# Patient Record
Sex: FEMALE | Race: WHITE | NOT HISPANIC OR LATINO | Employment: UNEMPLOYED | URBAN - METROPOLITAN AREA
[De-identification: names, ages, dates, MRNs, and addresses within clinical notes are randomized per-mention and may not be internally consistent; named-entity substitution may affect disease eponyms.]

---

## 2023-02-22 ENCOUNTER — OUTSIDE PLACE OF SERVICE (OUTPATIENT)
Dept: PEDIATRIC CARDIOLOGY | Facility: CLINIC | Age: 28
End: 2023-02-22
Payer: MEDICAID

## 2023-02-22 PROCEDURE — 99204 PR OFFICE/OUTPT VISIT, NEW, LEVL IV, 45-59 MIN: ICD-10-PCS | Mod: 25,,, | Performed by: PEDIATRICS

## 2023-02-22 PROCEDURE — 76825 ECHO EXAM OF FETAL HEART: CPT | Mod: 26,,, | Performed by: PEDIATRICS

## 2023-02-22 PROCEDURE — 76827 ECHO EXAM OF FETAL HEART: CPT | Mod: 26,,, | Performed by: PEDIATRICS

## 2023-02-22 PROCEDURE — 99204 OFFICE O/P NEW MOD 45 MIN: CPT | Mod: 25,,, | Performed by: PEDIATRICS

## 2023-02-22 PROCEDURE — 76827 PR  SO2 FETAL HEART DOPPLER: ICD-10-PCS | Mod: 26,,, | Performed by: PEDIATRICS

## 2023-02-22 PROCEDURE — 76825 PR  SO2 FETAL HEART: ICD-10-PCS | Mod: 26,,, | Performed by: PEDIATRICS

## 2023-02-22 NOTE — PROGRESS NOTES
"[facility] [place] [facility]    OB: Ana Champion MD    MFM: Delfino Lomax MD    Today, I had the pleasure of evaluating Oksana Jackson who is now 28 y.o. and carrying her fifth pregnancy at 23w5d gestation with an RAFAL of 2023. She was referred for evaluation of the fetal heart due to a prior pregnancy complicated by congenital heart disease. The patient's daughter is 5 years old and had a septal defect. The defect did not require surgical repair and underwent spontaneous resolution. The patient plans to deliver at [facility].    She is carrying a female fetus, named Sho Braden".      Obstetric History:    .  Her OB history is significant for severe postpartum depression.     Past Medical History:   Diagnosis Date    Anxiety state 2020 1:10:32 PM    [facility] Historical - Quick Add: Anxiety and depression-No Additional Notes    Gonococcal infection (acute) of lower genitourinary tract 2020 5:17:31 PM    [facility] Historical - Gynecologic: Gonorrhea-No Additional Notes    Streptococcus, group B, as the cause of diseases classified to other chapters 2019 12:47:29 PM    [facility] Historical - LWHA: Group B Strep-No Additional Notes    Trichomoniasis 2017 12:30:30 PM    [facility] Historical - Other: Trichomonas infection-No Additional Notes       No current outpatient medications on file.    Family History: Negative for early coronary artery disease, sudden unexplained death, connective tissues disorders, genetic syndromes, or other congenital anomalies.    FETAL ECHOCARDIOGRAM (summary):  S,D,S  Grossly structurally normal fetal heart with normally related great arteries.   There is a normal fetal foramen ovale with right to left flow.   Normal atrioventricular valve size with no significant atrioventricular valve insufficiency.   Good cardiac contractility.   Normal semilunar valve size and " function.   The ductus arteriosus was visualized with right to left flow.   Normal branch pulmonary artery size.   The aortic arch was not well visualized   Normal systemic venous drainage.   No pericardial effusion.   Fetal heart rate 140 bpm     Impression:  Single active female fetus at 23w5d gestation.  Normal fetal echocardiogram.      Todays fetal echocardiogram is normal, within the limitations of fetal echocardiography.  I discussed with her that fetal echocardiography is insufficiently sensitive to rule out all septal defects, anomalies of pulmonary and systemic veins, arch anomalies, and some valvar abnormalities, nor can it ensure that the ductus arteriosus and foramen ovale will spontaneously close.     Recommendations:  Location, timing, and mode of delivery will be determined by the obstetrical team.  She does not require further follow-up in the fetal echocardiography clinic, but I would be happy to see her again if additional questions or concerns arise.    Should there be any concerns about the baby's heart after birth, a post-ambrosio echocardiogram and cardiology consultation are recommended. Otherwise, the infant can be seen 2-4 weeks after discharge in cardiology clinic.     The above information was discussed in detail including the use of diagrams, with 45 minutes of total face to face time, with greater than 50% with counseling and coordination of care.  The discussion of the diagnosis and treatment options is as described above.      Aislinn Phillips MD  Pediatric Cardiology  [address]  [place] [address]  Office: [phone]

## 2025-02-20 ENCOUNTER — OFFICE VISIT (OUTPATIENT)
Dept: PRIMARY CARE CLINIC | Facility: CLINIC | Age: 30
End: 2025-02-20
Payer: MEDICAID

## 2025-02-20 DIAGNOSIS — Z13.220 ENCOUNTER FOR LIPID SCREENING FOR CARDIOVASCULAR DISEASE: ICD-10-CM

## 2025-02-20 DIAGNOSIS — F41.8 DEPRESSION WITH ANXIETY: ICD-10-CM

## 2025-02-20 DIAGNOSIS — Z13.1 SCREENING FOR DIABETES MELLITUS: ICD-10-CM

## 2025-02-20 DIAGNOSIS — Z13.6 ENCOUNTER FOR LIPID SCREENING FOR CARDIOVASCULAR DISEASE: ICD-10-CM

## 2025-02-20 DIAGNOSIS — Z00.00 GENERAL MEDICAL EXAM: Primary | ICD-10-CM

## 2025-02-20 DIAGNOSIS — Z78.9 PROFOUND INATTENTION: ICD-10-CM

## 2025-03-14 NOTE — PROGRESS NOTES
Subjective:       Patient ID: Oksana Jackson is a 30 y.o. female.    The patient location is: [place], [place]    Visit type: audiovisual-Synchronous      Face to Face time with patient: 11 min  20 minutes of total time spent on the encounter, which includes face to face time and non-face to face time preparing to see the patient (eg, review of tests), Obtaining and/or reviewing separately obtained history, Documenting clinical information in the electronic or other health record, Independently interpreting results (not separately reported) and communicating results to the patient/family/caregiver, or Care coordination (not separately reported).         Each patient to whom he or she provides medical services by telemedicine is:  (1) informed of the relationship between the physician and patient and the respective role of any other health care provider with respect to management of the patient; and (2) notified that he or she may decline to receive medical services by telemedicine and may withdraw from such care at any time.       History of Present Illness:   Oksana Jackson 30 y.o. female presents today with the following:   History of Present Illness    CHIEF COMPLAINT:  Ms. Jackson presents today to establish care and address care gaps.    ADHD AND ANXIETY:  She is requesting medication management and refills for ADHD and anxiety.    WEIGHT MANAGEMENT:  She expresses desire to lose weight.      ROS:  General: -fever, -chills, -fatigue, -weight gain, +weight loss, -loss of appetite  Eyes: -vision changes, -blurry vision, -eye pain, -eye discharge  ENT: -ear pain, -hearing loss, -tinnitus, -nasal congestion, -sore throat  Cardiovascular: -chest pain, -palpitations, -lower extremity edema  Respiratory: -cough, -shortness of breath, -wheezing, -sputum production  Endocrine: -polyuria, -polydipsia, -heat intolerance, -cold intolerance  Gastrointestinal: -abdominal pain, -heartburn, -nausea, -vomiting, -diarrhea,  -constipation, -blood in stool  Genitourinary: -dysuria, -urgency, -frequency, -hematuria, -nocturia, -incontinence  Heme & Lymphatic: -easy or excessive bleeding, -easy bruising, -swollen lymph nodes  Musculoskeletal: -muscle pain, -back pain, -joint pain, -joint swelling  Skin: -rash, -lesion, -itching, -skin texture changes, -skin color changes  Neurological: -headache, -dizziness, -numbness, -tingling, -seizure activity, -speech difficulty, -memory loss, -confusion  Psychiatric: +anxiety, -depression, -sleep difficulty        Past Medical History:   Diagnosis Date    Anxiety state 2/26/2020 1:10:32 PM    [facility] Historical - Quick Add: Anxiety and depression-No Additional Notes    Gonococcal infection (acute) of lower genitourinary tract 7/28/2020 5:17:31 PM    [facility] Historical - Gynecologic: Gonorrhea-No Additional Notes    Streptococcus, group B, as the cause of diseases classified to other chapters 9/6/2019 12:47:29 PM    [facility] Historical - LWHA: Group B Strep-No Additional Notes    Trichomoniasis 12/12/2017 12:30:30 PM    [facility] Historical - Other: Trichomonas infection-No Additional Notes     Family History   Problem Relation Name Age of Onset    Breast cancer Mother      Breast cancer Maternal Grandmother      Mental illness Maternal Grandmother      Mental illness Maternal Grandfather      Breast cancer Paternal Grandfather      Prostate cancer Maternal Uncle      Anxiety disorder Neg Hx      Hypertension Neg Hx      Ovarian cancer Neg Hx       Social History[1]  Encounter Medications[2]        Objective:      There were no vitals taken for this visit.  Physical Exam  Constitutional:       Appearance: Normal appearance.   Neurological:      Mental Status: She is alert.   Psychiatric:         Mood and Affect: Mood is not anxious or depressed.         Thought Content: Thought content does not include suicidal ideation. Thought content does not include homicidal or suicidal plan.                   No results found for this or any previous visit.  Assessment:       1. General medical exam    2. Depression with anxiety    3. Encounter for lipid screening for cardiovascular disease    4. Screening for diabetes mellitus    5. Profound inattention    Assessment & Plan    F90.9 Attention-deficit hyperactivity disorder, unspecified type  F41.9 Anxiety disorder, unspecified    ADHD:  - Assessed care gaps and medication management needs for ADHD.  - Ms. Jackson is requesting medication management and refills for ADHD.  - Evaluated the patient's presentation to establish care and address care gaps related to ADHD.  - Referred the patient to [facility] for assessment and treatment of ADHD.    ANXIETY:  - Assessed care gaps and medication management needs for anxiety.  - Ms. Jackson is requesting medication management and refills for anxiety.  - Evaluated the patient's presentation to establish care and address care gaps related to anxiety.  - Referred the patient to [facility] for assessment and treatment of anxiety.    WEIGHT MANAGEMENT:  - Determined in-person visit necessary for comprehensive evaluation including labs and physical assessment for weight loss management.  - Follow up for in-person visit for labs and physical assessment to address weight loss goals and determine appropriate interventions.        Plan:   General medical exam  -     CBC Auto Differential; Future; Expected date: 03/14/2025  -     Comprehensive Metabolic Panel; Future; Expected date: 03/14/2025    Depression with anxiety  -     Ambulatory referral/consult to [facility] (Non-Opioids); Future; Expected date: 03/21/2025    Encounter for lipid screening for cardiovascular disease  -     Lipid Panel; Future; Expected date: 03/14/2025    Screening for diabetes mellitus  -     Hemoglobin A1C; Future; Expected date: 03/14/2025    Profound inattention      Today's  encounter took a total time of 20 minutes, and that time included Preparing to see the patient (review records, tests), Obtaining and/or reviewing separately obtained historical data, Performing a medically appropriate examination and/or evaluation , Counseling & educating the patient/family/caregiver on treatment plan with chronic health conditions , ordering medications, tests, and/or procedures, Referring and communicating with other healthcare professionals , Documenting clinical information in the electronic or other health record, Independently interpreting results & communicating results to the patient/family/caregiver.           [facility]   [address]  [place] [address]  Office [phone]  Fax [phone]   This note was generated with the assistance of ambient listening technology. Verbal consent was obtained by the patient and accompanying visitor(s) for the recording of patient appointment to facilitate this note. I attest to having reviewed and edited the generated note for accuracy, though some syntax or spelling errors may persist. Please contact the author of this note for any clarification.          [1]   Social History  Socioeconomic History    Marital status: Single   Tobacco Use    Smoking status: Never    Smokeless tobacco: Never   Substance and Sexual Activity    Alcohol use: Not Currently    Drug use: Not Currently    Sexual activity: Yes     Partners: Male     Birth control/protection: None     Social Drivers of Health     Financial Resource Strain: Low Risk  (2/19/2025)    Overall Financial Resource Strain (CARDIA)     Difficulty of Paying Living Expenses: Not hard at all   Food Insecurity: No Food Insecurity (2/19/2025)    Hunger Vital Sign     Worried About Running Out of Food in the Last Year: Never true     Ran Out of Food in the Last Year: Never true   Transportation Needs: Unmet Transportation Needs (2/19/2025)    PRAPARE -  Transportation     Lack of Transportation (Medical): Yes     Lack of Transportation (Non-Medical): No   Physical Activity: Sufficiently Active (2/19/2025)    Exercise Vital Sign     Days of Exercise per Week: 5 days     Minutes of Exercise per Session: 60 min   Stress: Stress Concern Present (2/19/2025)    [employer] - Occupational Stress Questionnaire     Feeling of Stress : To some extent   Housing Stability: Low Risk  (2/19/2025)    Housing Stability Vital Sign     Unable to Pay for Housing in the Last Year: No     Number of Times Moved in the Last Year: 0     Homeless in the Last Year: No   [2]   No outpatient encounter medications on file as of 2/20/2025.     No facility-administered encounter medications on file as of 2/20/2025.